# Patient Record
Sex: MALE | Race: WHITE | Employment: FULL TIME | URBAN - METROPOLITAN AREA
[De-identification: names, ages, dates, MRNs, and addresses within clinical notes are randomized per-mention and may not be internally consistent; named-entity substitution may affect disease eponyms.]

---

## 2017-09-14 RX ORDER — SULFAMETHOXAZOLE AND TRIMETHOPRIM 800; 160 MG/1; MG/1
TABLET ORAL
Qty: 60 TABLET | Refills: 6 | Status: SHIPPED | OUTPATIENT
Start: 2017-09-14 | End: 2018-10-17

## 2018-09-26 RX ORDER — SULFAMETHOXAZOLE AND TRIMETHOPRIM 800; 160 MG/1; MG/1
TABLET ORAL
Qty: 60 TABLET | Refills: 5 | OUTPATIENT
Start: 2018-09-26

## 2018-10-17 RX ORDER — SULFAMETHOXAZOLE AND TRIMETHOPRIM 800; 160 MG/1; MG/1
TABLET ORAL
Qty: 60 TABLET | Refills: 0 | Status: SHIPPED | OUTPATIENT
Start: 2018-10-17 | End: 2018-11-01

## 2018-10-17 NOTE — TELEPHONE ENCOUNTER
Pt has an appt. Scheduled nov. 1st, would like to know if he can have a refill for one month until his appt.  pls advise

## 2018-11-01 ENCOUNTER — OFFICE VISIT (OUTPATIENT)
Dept: DERMATOLOGY CLINIC | Facility: CLINIC | Age: 28
End: 2018-11-01
Payer: COMMERCIAL

## 2018-11-01 DIAGNOSIS — L70.0 ACNE VULGARIS: Primary | ICD-10-CM

## 2018-11-01 PROCEDURE — 99213 OFFICE O/P EST LOW 20 MIN: CPT | Performed by: DERMATOLOGY

## 2018-11-01 PROCEDURE — 99212 OFFICE O/P EST SF 10 MIN: CPT | Performed by: DERMATOLOGY

## 2018-11-01 RX ORDER — SULFAMETHOXAZOLE AND TRIMETHOPRIM 800; 160 MG/1; MG/1
TABLET ORAL
Qty: 60 TABLET | Refills: 12 | Status: SHIPPED | OUTPATIENT
Start: 2018-11-01 | End: 2019-11-01

## 2018-11-12 NOTE — PROGRESS NOTES
Jorgito Cheung is a 29year old male. Patient presents with:  Acne: lov 9/2016. Patient presents for f/u of acne to face and back. Requesting refills of bactrim. Happy with medication            Patient has no known allergies.     Current Outpatient M Problem Relation Age of Onset   • Hypertension Mother                       HPI :      Patient presents with:  Acne: lov 9/2016. Patient presents for f/u of acne to face and back. Requesting refills of bactrim.  Happy with medication      Patient presents briefly reviewed. Sunscreen use, sun protection, encouraged. Followup as noted in rtc or p.r.n. No orders of the defined types were placed in this encounter.       Meds & Refills for this Visit:   Requested Prescriptions     Signed Prescriptions Disp R

## 2019-11-02 RX ORDER — SULFAMETHOXAZOLE AND TRIMETHOPRIM 800; 160 MG/1; MG/1
TABLET ORAL
Qty: 60 TABLET | Refills: 1 | Status: SHIPPED | OUTPATIENT
Start: 2019-11-02 | End: 2021-04-06

## 2019-12-05 ENCOUNTER — OFFICE VISIT (OUTPATIENT)
Dept: DERMATOLOGY CLINIC | Facility: CLINIC | Age: 29
End: 2019-12-05
Payer: COMMERCIAL

## 2019-12-05 DIAGNOSIS — L70.0 ACNE VULGARIS: Primary | ICD-10-CM

## 2019-12-05 PROCEDURE — 99213 OFFICE O/P EST LOW 20 MIN: CPT | Performed by: DERMATOLOGY

## 2019-12-05 PROCEDURE — 99212 OFFICE O/P EST SF 10 MIN: CPT | Performed by: DERMATOLOGY

## 2019-12-05 RX ORDER — PANTOPRAZOLE SODIUM 20 MG/1
TABLET, DELAYED RELEASE ORAL
COMMUNITY
Start: 2019-11-29

## 2019-12-05 RX ORDER — SULFAMETHOXAZOLE AND TRIMETHOPRIM 400; 80 MG/1; MG/1
1 TABLET ORAL 2 TIMES DAILY
Qty: 60 TABLET | Refills: 11 | Status: SHIPPED | OUTPATIENT
Start: 2019-12-05

## 2019-12-15 NOTE — PROGRESS NOTES
Nicolette Gutierrez is a 34year old male. Patient presents with:  Acne: LOV 11/1/18. pt presenting today with acne f/u. Occasional flare ups. Requesting refill on Sulfamethoxazole. Patient has no known allergies.   Current Outpatient Medication Lifestyle      Physical activity:        Days per week: Not on file        Minutes per session: Not on file      Stress: Not on file    Relationships      Social connections:        Talks on phone: Not on file        Gets together: Not on file        Atten including scalp, head, neck, face,nails, hair, external eyes, including conjunctival mucosa, eyelids, lips, external ears, back, chest, abdomen, arms, legs, palms.   Remarkable for lesions as noted   Rare inflammatory papule chin jawline back and chest have (two) times daily. Acne vulgaris  (primary encounter diagnosis)    No orders of the defined types were placed in this encounter. Results From Past 48 Hours:  No results found for this or any previous visit (from the past 48 hour(s)).     Meds Thi

## 2021-04-05 NOTE — TELEPHONE ENCOUNTER
Patient called    Asking for refill of Sulfamethoxazole-TMP -160 MG Oral Tab per tablet    LOV 12/5/2019

## 2021-04-06 RX ORDER — SULFAMETHOXAZOLE AND TRIMETHOPRIM 800; 160 MG/1; MG/1
TABLET ORAL
Qty: 60 TABLET | Refills: 0 | Status: SHIPPED | OUTPATIENT
Start: 2021-04-06

## 2021-04-06 NOTE — TELEPHONE ENCOUNTER
LOV 12/5/2019 - Last office visit notes state the plan was to have pt taper from Bactrim. Appt required to discuss and reassess prior to any refills? Thank you.

## 2022-09-08 ENCOUNTER — APPOINTMENT (OUTPATIENT)
Dept: GENERAL RADIOLOGY | Facility: HOSPITAL | Age: 32
End: 2022-09-08
Attending: NURSE PRACTITIONER
Payer: COMMERCIAL

## 2022-09-08 ENCOUNTER — HOSPITAL ENCOUNTER (EMERGENCY)
Facility: HOSPITAL | Age: 32
Discharge: HOME | End: 2022-09-08
Payer: COMMERCIAL

## 2022-09-08 VITALS
WEIGHT: 185 LBS | RESPIRATION RATE: 18 BRPM | SYSTOLIC BLOOD PRESSURE: 119 MMHG | TEMPERATURE: 98 F | HEART RATE: 82 BPM | OXYGEN SATURATION: 95 % | HEIGHT: 70 IN | DIASTOLIC BLOOD PRESSURE: 77 MMHG | BODY MASS INDEX: 26.48 KG/M2

## 2022-09-08 DIAGNOSIS — R07.9 CHEST PAIN OF UNCERTAIN ETIOLOGY: Primary | ICD-10-CM

## 2022-09-08 LAB
ANION GAP SERPL CALC-SCNC: 4 MMOL/L (ref 0–18)
BASOPHILS # BLD AUTO: 0.08 X10(3) UL (ref 0–0.2)
BASOPHILS NFR BLD AUTO: 0.7 %
BUN BLD-MCNC: 14 MG/DL (ref 7–18)
BUN/CREAT SERPL: 17.5 (ref 10–20)
CALCIUM BLD-MCNC: 9 MG/DL (ref 8.5–10.1)
CHLORIDE SERPL-SCNC: 106 MMOL/L (ref 98–112)
CO2 SERPL-SCNC: 29 MMOL/L (ref 21–32)
CREAT BLD-MCNC: 0.8 MG/DL
D DIMER PPP FEU-MCNC: <0.27 UG/ML FEU (ref ?–0.5)
DEPRECATED RDW RBC AUTO: 40.5 FL (ref 35.1–46.3)
EOSINOPHIL # BLD AUTO: 0.55 X10(3) UL (ref 0–0.7)
EOSINOPHIL NFR BLD AUTO: 4.9 %
ERYTHROCYTE [DISTWIDTH] IN BLOOD BY AUTOMATED COUNT: 13.3 % (ref 11–15)
GFR SERPLBLD BASED ON 1.73 SQ M-ARVRAT: 121 ML/MIN/1.73M2 (ref 60–?)
GLUCOSE BLD-MCNC: 95 MG/DL (ref 70–99)
HCT VFR BLD AUTO: 50.9 %
HGB BLD-MCNC: 17.3 G/DL
IMM GRANULOCYTES # BLD AUTO: 0.07 X10(3) UL (ref 0–1)
IMM GRANULOCYTES NFR BLD: 0.6 %
LYMPHOCYTES # BLD AUTO: 3.45 X10(3) UL (ref 1–4)
LYMPHOCYTES NFR BLD AUTO: 31 %
MCH RBC QN AUTO: 28.5 PG (ref 26–34)
MCHC RBC AUTO-ENTMCNC: 34 G/DL (ref 31–37)
MCV RBC AUTO: 83.7 FL
MONOCYTES # BLD AUTO: 1.07 X10(3) UL (ref 0.1–1)
MONOCYTES NFR BLD AUTO: 9.6 %
NEUTROPHILS # BLD AUTO: 5.91 X10 (3) UL (ref 1.5–7.7)
NEUTROPHILS # BLD AUTO: 5.91 X10(3) UL (ref 1.5–7.7)
NEUTROPHILS NFR BLD AUTO: 53.2 %
OSMOLALITY SERPL CALC.SUM OF ELEC: 288 MOSM/KG (ref 275–295)
PLATELET # BLD AUTO: 405 10(3)UL (ref 150–450)
POTASSIUM SERPL-SCNC: 4.4 MMOL/L (ref 3.5–5.1)
RBC # BLD AUTO: 6.08 X10(6)UL
SODIUM SERPL-SCNC: 139 MMOL/L (ref 136–145)
TROPONIN I HIGH SENSITIVITY: 4 NG/L
TROPONIN I HIGH SENSITIVITY: 5 NG/L
TSI SER-ACNC: 1.03 MIU/ML (ref 0.36–3.74)
WBC # BLD AUTO: 11.1 X10(3) UL (ref 4–11)

## 2022-09-08 PROCEDURE — 93005 ELECTROCARDIOGRAM TRACING: CPT

## 2022-09-08 PROCEDURE — 99284 EMERGENCY DEPT VISIT MOD MDM: CPT

## 2022-09-08 PROCEDURE — 80048 BASIC METABOLIC PNL TOTAL CA: CPT | Performed by: NURSE PRACTITIONER

## 2022-09-08 PROCEDURE — 36415 COLL VENOUS BLD VENIPUNCTURE: CPT

## 2022-09-08 PROCEDURE — 85060 BLOOD SMEAR INTERPRETATION: CPT | Performed by: NURSE PRACTITIONER

## 2022-09-08 PROCEDURE — 85025 COMPLETE CBC W/AUTO DIFF WBC: CPT | Performed by: NURSE PRACTITIONER

## 2022-09-08 PROCEDURE — 99285 EMERGENCY DEPT VISIT HI MDM: CPT

## 2022-09-08 PROCEDURE — 84443 ASSAY THYROID STIM HORMONE: CPT | Performed by: NURSE PRACTITIONER

## 2022-09-08 PROCEDURE — 71045 X-RAY EXAM CHEST 1 VIEW: CPT | Performed by: NURSE PRACTITIONER

## 2022-09-08 PROCEDURE — 85379 FIBRIN DEGRADATION QUANT: CPT | Performed by: NURSE PRACTITIONER

## 2022-09-08 PROCEDURE — 93010 ELECTROCARDIOGRAM REPORT: CPT | Performed by: NURSE PRACTITIONER

## 2022-09-08 PROCEDURE — 84484 ASSAY OF TROPONIN QUANT: CPT | Performed by: NURSE PRACTITIONER

## 2022-09-08 RX ORDER — ASPIRIN 81 MG/1
324 TABLET, CHEWABLE ORAL ONCE
Status: COMPLETED | OUTPATIENT
Start: 2022-09-08 | End: 2022-09-08

## 2022-09-08 NOTE — ED INITIAL ASSESSMENT (HPI)
Reports racing heart, discomfort in chest, diaphoretic this am. Pt called MD office and was advised to go to ER.

## 2024-07-14 ENCOUNTER — HOSPITAL ENCOUNTER (EMERGENCY)
Facility: HOSPITAL | Age: 34
Discharge: HOME | End: 2024-07-14
Attending: EMERGENCY MEDICINE
Payer: COMMERCIAL

## 2024-07-14 ENCOUNTER — ANESTHESIA (OUTPATIENT)
Dept: ENDOSCOPY | Facility: HOSPITAL | Age: 34
End: 2024-07-14
Payer: COMMERCIAL

## 2024-07-14 ENCOUNTER — ANESTHESIA EVENT (OUTPATIENT)
Dept: ENDOSCOPY | Facility: HOSPITAL | Age: 34
End: 2024-07-14
Payer: COMMERCIAL

## 2024-07-14 VITALS
DIASTOLIC BLOOD PRESSURE: 84 MMHG | WEIGHT: 182 LBS | TEMPERATURE: 97 F | RESPIRATION RATE: 20 BRPM | BODY MASS INDEX: 25.48 KG/M2 | SYSTOLIC BLOOD PRESSURE: 133 MMHG | HEIGHT: 71 IN | OXYGEN SATURATION: 96 % | HEART RATE: 79 BPM
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
## 2024-07-14 DIAGNOSIS — T18.128A ESOPHAGEAL OBSTRUCTION DUE TO FOOD IMPACTION: Primary | ICD-10-CM

## 2024-07-14 PROCEDURE — 99284 EMERGENCY DEPT VISIT MOD MDM: CPT | Performed by: INTERNAL MEDICINE

## 2024-07-14 PROCEDURE — 0DC58ZZ EXTIRPATION OF MATTER FROM ESOPHAGUS, VIA NATURAL OR ARTIFICIAL OPENING ENDOSCOPIC: ICD-10-PCS | Performed by: INTERNAL MEDICINE

## 2024-07-14 PROCEDURE — 43247 EGD REMOVE FOREIGN BODY: CPT | Performed by: INTERNAL MEDICINE

## 2024-07-14 RX ORDER — MIDAZOLAM HYDROCHLORIDE 1 MG/ML
INJECTION INTRAMUSCULAR; INTRAVENOUS AS NEEDED
Status: DISCONTINUED | OUTPATIENT
Start: 2024-07-14 | End: 2024-07-14 | Stop reason: SURG

## 2024-07-14 RX ORDER — ONDANSETRON 2 MG/ML
INJECTION INTRAMUSCULAR; INTRAVENOUS AS NEEDED
Status: DISCONTINUED | OUTPATIENT
Start: 2024-07-14 | End: 2024-07-14 | Stop reason: SURG

## 2024-07-14 RX ORDER — SODIUM CHLORIDE, SODIUM LACTATE, POTASSIUM CHLORIDE, CALCIUM CHLORIDE 600; 310; 30; 20 MG/100ML; MG/100ML; MG/100ML; MG/100ML
INJECTION, SOLUTION INTRAVENOUS CONTINUOUS PRN
Status: DISCONTINUED | OUTPATIENT
Start: 2024-07-14 | End: 2024-07-14 | Stop reason: SURG

## 2024-07-14 RX ORDER — MORPHINE SULFATE 4 MG/ML
2 INJECTION, SOLUTION INTRAMUSCULAR; INTRAVENOUS EVERY 10 MIN PRN
Status: DISCONTINUED | OUTPATIENT
Start: 2024-07-14 | End: 2024-07-14 | Stop reason: HOSPADM

## 2024-07-14 RX ORDER — HYDROMORPHONE HYDROCHLORIDE 1 MG/ML
0.6 INJECTION, SOLUTION INTRAMUSCULAR; INTRAVENOUS; SUBCUTANEOUS EVERY 5 MIN PRN
Status: DISCONTINUED | OUTPATIENT
Start: 2024-07-14 | End: 2024-07-14 | Stop reason: HOSPADM

## 2024-07-14 RX ORDER — HYDROMORPHONE HYDROCHLORIDE 1 MG/ML
0.4 INJECTION, SOLUTION INTRAMUSCULAR; INTRAVENOUS; SUBCUTANEOUS EVERY 5 MIN PRN
Status: DISCONTINUED | OUTPATIENT
Start: 2024-07-14 | End: 2024-07-14 | Stop reason: HOSPADM

## 2024-07-14 RX ORDER — SODIUM CHLORIDE, SODIUM LACTATE, POTASSIUM CHLORIDE, CALCIUM CHLORIDE 600; 310; 30; 20 MG/100ML; MG/100ML; MG/100ML; MG/100ML
INJECTION, SOLUTION INTRAVENOUS CONTINUOUS
Status: DISCONTINUED | OUTPATIENT
Start: 2024-07-14 | End: 2024-07-14 | Stop reason: HOSPADM

## 2024-07-14 RX ORDER — MORPHINE SULFATE 4 MG/ML
4 INJECTION, SOLUTION INTRAMUSCULAR; INTRAVENOUS EVERY 10 MIN PRN
Status: DISCONTINUED | OUTPATIENT
Start: 2024-07-14 | End: 2024-07-14 | Stop reason: HOSPADM

## 2024-07-14 RX ORDER — HYDROMORPHONE HYDROCHLORIDE 1 MG/ML
0.2 INJECTION, SOLUTION INTRAMUSCULAR; INTRAVENOUS; SUBCUTANEOUS EVERY 5 MIN PRN
Status: DISCONTINUED | OUTPATIENT
Start: 2024-07-14 | End: 2024-07-14 | Stop reason: HOSPADM

## 2024-07-14 RX ORDER — NALOXONE HYDROCHLORIDE 0.4 MG/ML
80 INJECTION, SOLUTION INTRAMUSCULAR; INTRAVENOUS; SUBCUTANEOUS AS NEEDED
Status: DISCONTINUED | OUTPATIENT
Start: 2024-07-14 | End: 2024-07-14 | Stop reason: HOSPADM

## 2024-07-14 RX ORDER — MORPHINE SULFATE 10 MG/ML
6 INJECTION, SOLUTION INTRAMUSCULAR; INTRAVENOUS EVERY 10 MIN PRN
Status: DISCONTINUED | OUTPATIENT
Start: 2024-07-14 | End: 2024-07-14 | Stop reason: HOSPADM

## 2024-07-14 RX ORDER — DEXAMETHASONE SODIUM PHOSPHATE 4 MG/ML
VIAL (ML) INJECTION AS NEEDED
Status: DISCONTINUED | OUTPATIENT
Start: 2024-07-14 | End: 2024-07-14 | Stop reason: SURG

## 2024-07-14 RX ADMIN — MIDAZOLAM HYDROCHLORIDE 2 MG: 1 INJECTION INTRAMUSCULAR; INTRAVENOUS at 19:32:00

## 2024-07-14 RX ADMIN — SODIUM CHLORIDE, SODIUM LACTATE, POTASSIUM CHLORIDE, CALCIUM CHLORIDE: 600; 310; 30; 20 INJECTION, SOLUTION INTRAVENOUS at 19:20:00

## 2024-07-14 RX ADMIN — ONDANSETRON 4 MG: 2 INJECTION INTRAMUSCULAR; INTRAVENOUS at 19:38:00

## 2024-07-14 RX ADMIN — DEXAMETHASONE SODIUM PHOSPHATE 4 MG: 4 MG/ML VIAL (ML) INJECTION at 19:38:00

## 2024-07-14 NOTE — ED PROVIDER NOTES
Patient Seen in: [facility] Emergency Department    History     Chief Complaint   Patient presents with    FB in Throat       HPI    Patient presents to the ED complaining of something stuck in his throat.  He states that he was eating a beef sandwich today around 3 PM when he felt a piece of piece get stuck in his throat.  He states he is unable to swallow anything following this and spits up any fluids that he tries to drink.  No history of obstruction in the past.    History reviewed.   Past Medical History:    Acne       History reviewed. History reviewed. No pertinent surgical history.      Medications :  (Not in a hospital admission)       Family History   Problem Relation Age of Onset    Hypertension Mother        Smoking Status:   Social History     Socioeconomic History    Marital status: Single   Tobacco Use    Smoking status: Former     Current packs/day: 0.50     Types: Cigarettes    Smokeless tobacco: Never   Vaping Use    Vaping status: Never Used   Substance and Sexual Activity    Alcohol use: Yes     Comment: social     Drug use: Never   Other Topics Concern    Grew up on a farm No    History of tanning No    Outdoor occupation No    Pt has a pacemaker No    Pt has a defibrillator No    Reaction to local anesthetic No       Constitutional and vital signs reviewed.      Social History and Family History elements reviewed from today, pertinent positives to the presenting problem noted.    Physical Exam     ED Triage Vitals [07/14/24 1640]   BP (!) 160/93   Pulse 63   Resp 18   Temp 98 °F (36.7 °C)   Temp src Temporal   SpO2 99 %   O2 Device None (Room air)       All measures to prevent infection transmission during my interaction with the patient were taken. Handwashing was performed prior to and after the exam.  Stethoscope and any equipment used during my examination was cleaned with super sani-cloth germicidal wipes following the exam.     Physical Exam  Vitals and nursing note reviewed.    Constitutional:       General: He is not in acute distress.     Appearance: He is well-developed.   HENT:      Head: Normocephalic and atraumatic.   Eyes:      General:         Right eye: No discharge.         Left eye: No discharge.      Conjunctiva/sclera: Conjunctivae normal.   Neck:      Trachea: No tracheal deviation.   Cardiovascular:      Rate and Rhythm: Normal rate.   Pulmonary:      Effort: Pulmonary effort is normal. No respiratory distress.      Breath sounds: Normal breath sounds. No stridor.   Abdominal:      General: There is no distension.      Palpations: Abdomen is soft.      Tenderness: There is no abdominal tenderness.      Comments: Unable to tolerate p.o. fluids, vomits any fluids that he swallows   Musculoskeletal:         General: No deformity.   Skin:     General: Skin is warm and dry.   Neurological:      Mental Status: He is alert and oriented to person, place, and time.   Psychiatric:         Mood and Affect: Mood normal.         Behavior: Behavior normal.         ED Course      Labs Reviewed - No data to display    As Interpreted by me    Imaging Results Available and Reviewed while in ED: No results found.  ED Medications Administered: Medications - No data to display      MDM     Vitals:    07/14/24 1640   BP: (!) 160/93   Pulse: 63   Resp: 18   Temp: 98 °F (36.7 °C)   TempSrc: Temporal   SpO2: 99%   Weight: 83.9 kg     *I personally reviewed and interpreted all ED vitals.    Pulse Ox: 99%, Room air, Normal     Monitor Interpretation:   normal sinus rhythm as interpreted by me.  The cardiac monitor was ordered monitor heart rate.    Differential Diagnosis/ Diagnostic Considerations: Esophageal obstruction    Complicating Factors: The patient already has does not have a problem list on file. to contribute to the complexity of this ED evaluation.    Medical Decision Making  Patient presents to the ED with an esophageal obstruction.  I discussed with Dr. Garcia who will take the patient  to the GI lab for definitive management.    Problems Addressed:  Esophageal obstruction due to food impaction: acute illness or injury that poses a threat to life or bodily functions    Amount and/or Complexity of Data Reviewed  Discussion of management or test interpretation with external provider(s): I discussed with Dr. Garcia for GI consultation        Condition upon leaving the department: Stable    Disposition and Plan     Clinical Impression:  1. Esophageal obstruction due to food impaction        Disposition:  Send to or/cath/gi    Follow-up:  Marcell Marin III  [address] [place] [id number] [phone]    Schedule an appointment as soon as possible for a visit in 3 day(s)        Medications Prescribed:  Current Discharge Medication List

## 2024-07-14 NOTE — ED INITIAL ASSESSMENT (HPI)
Pt to ED via triage c/o possible FB in throat/upper chest. States around 1500 he was eating a beef sandwich. States he attempted PO fluids after which he is spitting back up. No carlos a, abcs intact, speaking in full sentences.

## 2024-07-14 NOTE — CONSULTS
[facility]  Report of Consultation    Jhon Gallego Patient Status:  Emergency    1990 MRN [id number]   Location [facility] EMERGENCY DEPARTMENT Attending Brad Almodovar MD   Hosp Day # 0 PCP KAT VENTURA III     Reason for Consultation:  -Food impaction    History of Present Illness:  Jhon Gallego is a a(n) 34 year old male who has a history of chronic reflux, acne who presents with food impaction.    The patient reports he was eating lunch today about 1230 when he took a bite of a beef sandwich when swallowing it felt to get stuck.  He has been unable to dislodge it.  He feels he cannot handle his secretions and has to spit up saliva.  He denies any neck pain or chest pain, no shortness of breath.  He has not had a food impaction before but he has had chronic reflux and has been on PPI therapy.  He has had 2 prior upper endoscopy through [facility].  He has been told he has reflux/esophagitis.    History:  Past Medical History:    Acne     History reviewed. No pertinent surgical history.  Family History   Problem Relation Age of Onset    Hypertension Mother       reports that he has quit smoking. His smoking use included cigarettes. He has never used smokeless tobacco. He reports current alcohol use. He reports that he does not use drugs.    Allergies:  No Known Allergies    Medications:  No current facility-administered medications for this encounter.    Physical Exam:  Blood pressure (!) 160/93, pulse 63, temperature 98 °F (36.7 °C), temperature source Temporal, resp. rate 18, weight 185 lb (83.9 kg), SpO2 99%.    General: Alert, orientated x3.  Cooperative.  No apparent distress.  HEENT: Exam is unremarkable.  Neck: Supple.  Lungs: Clear bilaterally.  Cardiac: Regular rate and rhythm.  Abdomen:  Bowel sounds present, normoactive.  Nontender.  Extremities:  No lower extremity edema noted.  Skin: Normal texture and turgor.  Lymphatic:  No cervical lymphadenopathy.    Impression and  Plan:  GERD  Acute food impaction    Jhon is a 34-year-old male who has a history of chronic reflux, likely stricture and subsequent food impaction.  Patient is unable to handle secretions and have advised upper endoscopy to dislodge the food impaction and inspect the esophagus and subsequent tissues.  Risks and benefits were discussed.  Will set this up with anesthesia.    After procedure patient will remain on a liquid diet for 24 hours and then soft foods for the next 48 hours after that.  He will continue on PPI therapy.  Follow-up at [place] or here for further GI management of his condition.      Norris Garcia MD  7/14/2024  6:50 PM

## 2024-07-15 NOTE — ANESTHESIA POSTPROCEDURE EVALUATION
Patient: Jhon Gallego    Procedure Summary       Date: 07/14/24 Room / Location: [facility] ENDOSCOPY 01 / [facility] ENDOSCOPY    Anesthesia Start: 1931 Anesthesia Stop:     Procedure: ESOPHAGOGASTRODUODENOSCOPY (EGD) WITH FOREIGN BODY REMOVAL Diagnosis: (GE junction stricture, food impaction)    Surgeons: Norris Garcia MD Anesthesiologist: Taniya Aparicio MD    Anesthesia Type: general ASA Status: 1 - Emergent            Anesthesia Type: general    Vitals Value Taken Time   /88 07/14/24 1959   Temp 97.4 °F (36.3 °C) 07/14/24 1959   Pulse 100 07/14/24 1959   Resp 15 07/14/24 1959   SpO2 98 % 07/14/24 1959       [facility] AN Post Evaluation:   Patient Evaluated in PACU  Patient Participation: complete - patient participated  Level of Consciousness: awake and alert  Pain Score: 0  Pain Management: adequate  Airway Patency:  Dental exam unchanged from preop  Yes    Nausea/Vomiting: none  Cardiovascular Status: stable  Respiratory Status: room air  Postoperative Hydration stable      TANIYA APARICIO MD  7/14/2024 7:59 PM

## 2024-07-15 NOTE — ANESTHESIA PREPROCEDURE EVALUATION
Anesthesia PreOp Note    HPI:     Jhon Gallego is a 34 year old male who presents for preoperative consultation requested by: Norris Garcia MD    Date of Surgery: 7/14/2024    Procedure(s):  ESOPHAGOGASTRODUODENOSCOPY WITH FOREIGN BODY REMOVAL [[id number]]  Indication: none given    Relevant Problems   No relevant active problems       NPO:  Last Liquid Consumption Date: 07/14/24  Last Liquid Consumption Time: 1400  Last Solid Consumption Date: 07/14/24  Last Solid Consumption Time: 1400  Last Liquid Consumption Date: 07/14/24          History Review:  Patient Active Problem List    Diagnosis Date Noted    Esophageal obstruction due to food impaction 07/14/2024       Past Medical History:    Acne       History reviewed. No pertinent surgical history.    Medications Prior to Admission   Medication Sig Dispense Refill Last Dose    Pantoprazole Sodium 20 MG Oral Tab EC    7/13/2024    Sulfamethoxazole-TMP -160 MG Oral Tab per tablet TAKE ONE TABLET TWO TIMES A DAY (Patient not taking: Reported on 7/14/2024) 60 tablet 0 Not Taking    sulfamethoxazole-trimethoprim 400-80 MG Oral Tab Take 1 tablet by mouth 2 (two) times daily. (Patient not taking: Reported on 7/14/2024) 60 tablet 11 Not Taking     No current Epic-ordered facility-administered medications on file.     No current [facility]-ordered outpatient medications on file.       No Known Allergies    Family History   Problem Relation Age of Onset    Hypertension Mother      Social History     Socioeconomic History    Marital status: Single   Tobacco Use    Smoking status: Former     Current packs/day: 0.50     Types: Cigarettes    Smokeless tobacco: Never   Vaping Use    Vaping status: Never Used   Substance and Sexual Activity    Alcohol use: Yes     Comment: social     Drug use: Never   Other Topics Concern    Grew up on a farm No    History of tanning No    Outdoor occupation No    Pt has a pacemaker No    Pt has a defibrillator No    Reaction to local  anesthetic No       Available pre-op labs reviewed.             Vital Signs:  Body mass index is 25.38 kg/m².   height is 1.803 m (5' 11\") and weight is 82.6 kg (182 lb). His temporal temperature is 98 °F (36.7 °C). His blood pressure is 147/71 and his pulse is 77. His respiration is 18 and oxygen saturation is 99%.   Vitals:    07/14/24 1640 07/14/24 1914   BP: (!) 160/93 147/71   Pulse: 63 77   Resp: 18 18   Temp: 98 °F (36.7 °C)    TempSrc: Temporal    SpO2: 99%    Weight: 83.9 kg (185 lb) 82.6 kg (182 lb)   Height:  1.803 m (5' 11\")        Anesthesia Evaluation     Patient summary reviewed and Nursing notes reviewed    Airway   Mallampati: II  TM distance: >3 FB  Neck ROM: full  Dental      Pulmonary - negative ROS and normal exam   Cardiovascular - negative ROS and normal exam  Exercise tolerance: good    NYHA Classification: I  ECG reviewed    Neuro/Psych - negative ROS     GI/Hepatic/Renal - negative ROS     Endo/Other - negative ROS   Abdominal  - normal exam                 Anesthesia Plan:   ASA:  1  Emergent    Plan:   General  Airway:  ETT  Informed Consent Plan and Risks Discussed With:  Patient and mother  Discussed plan with:  Attending and surgeon  Provider Attestation (if preop done by other):  GA/ETT/PONV,dental damages etc      I have informed Jhon Gallego and/or legal guardian or family member of the nature of the anesthetic plan, benefits, risks including possible dental damage if relevant, major complications, and any alternative forms of anesthetic management.   All of the patient's questions were answered to the best of my ability. The patient desires the anesthetic management as planned.  JACOBO APARICIO MD  7/14/2024 7:18 PM  Present on Admission:  **None**

## 2024-07-15 NOTE — OPERATIVE REPORT
[facility] Endoscopy Report  Date of procedure-July 14, 2024    Preoperative Diagnosis:  -Food impaction  -History of reflux/esophagitis      Postoperative Diagnosis:  -Food impaction successfully dislodged  -Esophageal stricture noted at 42 cm/GE junction    Procedure:    Esophagogastroduodenoscopy       Surgeon:  Norris Garcia M.D.    Anesthesia:   general anesthesia-please see the records for full details    Technique:  After informed consent, the patient was placed in the left lateral recumbent position.  An Olympus adult HD gastroscope was inserted into the hypopharynx and advanced under direct vision into the esophagus -food impaction encountered in the distal esophagus and at approximately 40 cm, this was removed by using the Marsh net to grab pieces of the food impaction and remove them-this came out and about 3 separate passes.  The scope was then advanced into the stomach where a small amount of retained food was noted, the exam was then discontinued at this point.  Insufflated air and fluid were suctioned and the endoscope was withdrawn.  The procedure was well tolerated without immediate complication.      Findings:  The esophagus showed a subtle stricture at the GE junction at 42 cm.  The GE junction and diaphragmatic impression were at 42 cm, no hiatal hernia.  The stomach distended appropriately with insufflated air.  The mucosa of the stomach including cardia, fundus, gastric body and antrum were normal.  The duodenal bulb and post bulbar regions were not visualized on this exam.    Estimated blood loss-none  Specimens-none    Impression:  -Food impaction successfully dislodged  -Esophageal stricture noted at 42 cm/GE junction    Recommendations:  -Post procedure instructions given-liquid diet today and then soft foods for the next 40 hours  -Continue on PPI therapy  -Repeat upper endoscopy for reevaluation, possible biopsy and dilation of stricture          Norris Garcia,  MD  7/14/2024  7:51 PM

## 2024-07-15 NOTE — ANESTHESIA PROCEDURE NOTES
Airway  Date/Time: 7/14/2024 7:35 PM  Urgency: Elective    Airway not difficult    General Information and Staff    Patient location during procedure: OR  Anesthesiologist: Taniya Richard MD  Performed: anesthesiologist   Performed by: Taniya Richard MD  Authorized by: Taniya Richard MD      Indications and Patient Condition  Indications for airway management: anesthesia  Spontaneous Ventilation: absent  Sedation level: deep  Preoxygenated: yes  Patient position: sniffing  Mask difficulty assessment: 0 - not attempted  Planned trial extubation    Final Airway Details  Final airway type: endotracheal airway      Successful airway: ETT  Cuffed: yes   Successful intubation technique: direct laryngoscopy  Facilitating devices/methods: intubating stylet, cricoid pressure and rapid sequence intubation  Endotracheal tube insertion site: oral  Blade: Sascha  Blade size: #3  ETT size (mm): 7.0    Cormack-Lehane Classification: grade I - full view of glottis  Placement verified by: capnometry   Measured from: teeth  ETT to teeth (cm): 23  Number of attempts at approach: 1  Ventilation between attempts: none  Number of other approaches attempted: 0    Additional Comments  Olivier  # 3

## 2024-07-15 NOTE — DISCHARGE INSTRUCTIONS
Home Care Instructions for Gastroscopy with Sedation    Diet:  - Resume your regular diet as tolerated unless otherwise instructed.  - Start with light meals to minimize bloating.  - Do not drink alcohol today.    Medication:  - If you have questions about resuming your normal medications, please contact your Primary Care Physician.    Activities:  - Take it easy today. Do not return to work today.  - Do not drive today.  - Do not operate any machinery today (including kitchen equipment).  - Do not make any critical decisions or sign any paperwork.  - Do not exercise today.    Gastroscopy:  - You may have a sore throat for 2-3 days following the exam. This is normal. Gargling with warm salt water (1/2 tsp salt to 1 glass warm water) or using throat lozenges will help.  - If you experience any sharp pain in your neck, abdomen or chest, vomiting of blood, oral temperature over 100 degrees Fahrenheit, light-headedness or dizziness, or any other problems, contact your doctor.    **If unable to reach your doctor, please go to the [facility] Emergency Room**    - Your referring physician will receive a full report of your examination.  - If you do not hear from your doctor's office within two weeks of your biopsy, please call them for your results.    You may be able to see your laboratory results in [employer] between 4 and 7 business days.  In some cases, your physician may not have viewed the results before they are released to [facility] [employer] [facility].  If you have questions regarding your results contact the physician who ordered the test/exam by phone or via [facility] [facility] by choosing \"Ask a Medical Question.\"

## (undated) DEVICE — NET SPEC 3X6CM TIP DIA2.5MM CATH L230CM

## (undated) DEVICE — CONMED SCOPE SAVER BITE BLOCK, 20X27 MM: Brand: SCOPE SAVER

## (undated) DEVICE — MEDI-VAC NON-CONDUCTIVE SUCTION TUBING: Brand: CARDINAL HEALTH

## (undated) DEVICE — SYRINGE, LUER SLIP, STERILE, 60ML: Brand: MEDLINE

## (undated) DEVICE — KIT CLEAN ENDOKIT 1.1OZ GOWNX2

## (undated) DEVICE — CO2 CANNULA,SSOFT,ADLT,7O2,4CO2,FEMALE: Brand: MEDLINE

## (undated) DEVICE — MEDI-VAC NON-CONDUCTIVE SUCTION TUBING 6MM X 1.8M (6FT.) L: Brand: CARDINAL HEALTH

## (undated) DEVICE — YANKAUER,BULB TIP,W/O VENT,RIGID,STERILE: Brand: MEDLINE

## (undated) DEVICE — KIT ENDO ORCAPOD 160/180/190